# Patient Record
Sex: FEMALE | URBAN - METROPOLITAN AREA
[De-identification: names, ages, dates, MRNs, and addresses within clinical notes are randomized per-mention and may not be internally consistent; named-entity substitution may affect disease eponyms.]

---

## 2020-04-06 ENCOUNTER — TELEPHONE (OUTPATIENT)
Dept: FAMILY MEDICINE CLINIC | Facility: CLINIC | Age: 75
End: 2020-04-06

## 2020-04-06 RX ORDER — AMLODIPINE BESYLATE AND BENAZEPRIL HYDROCHLORIDE 5; 40 MG/1; MG/1
1 CAPSULE ORAL DAILY
Qty: 90 CAPSULE | Refills: 1 | Status: SHIPPED | OUTPATIENT
Start: 2020-04-06

## 2020-04-06 NOTE — TELEPHONE ENCOUNTER
Lizett is currently scheduled and she states she signed a release at the [place] office before it closed? Sent [employer] release out to sign through mail on 4/7. She is needing one ninety day medication refill of amlodipine-benazepril 5/40mg.

## 2020-04-06 NOTE — TELEPHONE ENCOUNTER
Pt left a voicemail requesting a med refill for BP med.   I called pt back at [phone] to get rx info and schedule pt appointment. No answer, left voicemail for pt to callback.